# Patient Record
Sex: FEMALE | Race: WHITE | NOT HISPANIC OR LATINO | URBAN - METROPOLITAN AREA
[De-identification: names, ages, dates, MRNs, and addresses within clinical notes are randomized per-mention and may not be internally consistent; named-entity substitution may affect disease eponyms.]

---

## 2023-06-27 ENCOUNTER — OFFICE VISIT (OUTPATIENT)
Dept: PRIMARY CARE | Facility: CLINIC | Age: 58
End: 2023-06-27
Payer: COMMERCIAL

## 2023-06-27 VITALS
WEIGHT: 293 LBS | HEART RATE: 85 BPM | RESPIRATION RATE: 16 BRPM | HEIGHT: 70 IN | DIASTOLIC BLOOD PRESSURE: 80 MMHG | BODY MASS INDEX: 41.95 KG/M2 | SYSTOLIC BLOOD PRESSURE: 140 MMHG

## 2023-06-27 DIAGNOSIS — E66.01 OBESITY, CLASS III, BMI 40-49.9 (MORBID OBESITY) (MULTI): ICD-10-CM

## 2023-06-27 DIAGNOSIS — E78.00 HYPERCHOLESTEROLEMIA: ICD-10-CM

## 2023-06-27 DIAGNOSIS — R76.0 LUPUS ANTICOAGULANT POSITIVE: ICD-10-CM

## 2023-06-27 DIAGNOSIS — E11.9 TYPE 2 DIABETES MELLITUS WITHOUT COMPLICATION, WITHOUT LONG-TERM CURRENT USE OF INSULIN (MULTI): ICD-10-CM

## 2023-06-27 DIAGNOSIS — Z00.00 ROUTINE GENERAL MEDICAL EXAMINATION AT A HEALTH CARE FACILITY: Primary | ICD-10-CM

## 2023-06-27 PROBLEM — I26.09 OTHER PULMONARY EMBOLISM WITH ACUTE COR PULMONALE (MULTI): Status: ACTIVE | Noted: 2023-06-27

## 2023-06-27 PROBLEM — E28.2 PCOS (POLYCYSTIC OVARIAN SYNDROME): Status: ACTIVE | Noted: 2023-06-27

## 2023-06-27 PROBLEM — E66.813 OBESITY, CLASS III, BMI 40-49.9 (MORBID OBESITY): Status: ACTIVE | Noted: 2018-05-29

## 2023-06-27 PROBLEM — E55.9 VITAMIN D DEFICIENCY: Status: ACTIVE | Noted: 2023-06-27

## 2023-06-27 PROBLEM — I82.409 DVT (DEEP VENOUS THROMBOSIS) (MULTI): Status: ACTIVE | Noted: 2023-06-27

## 2023-06-27 PROBLEM — L71.9 ROSACEA: Status: ACTIVE | Noted: 2023-06-27

## 2023-06-27 LAB — POC HEMOGLOBIN A1C: 8.9 % (ref 4.2–6.5)

## 2023-06-27 PROCEDURE — 3079F DIAST BP 80-89 MM HG: CPT | Performed by: FAMILY MEDICINE

## 2023-06-27 PROCEDURE — 99396 PREV VISIT EST AGE 40-64: CPT | Performed by: FAMILY MEDICINE

## 2023-06-27 PROCEDURE — 83036 HEMOGLOBIN GLYCOSYLATED A1C: CPT | Performed by: FAMILY MEDICINE

## 2023-06-27 PROCEDURE — 3077F SYST BP >= 140 MM HG: CPT | Performed by: FAMILY MEDICINE

## 2023-06-27 PROCEDURE — 1036F TOBACCO NON-USER: CPT | Performed by: FAMILY MEDICINE

## 2023-06-27 RX ORDER — CETIRIZINE HYDROCHLORIDE 10 MG/1
10 TABLET ORAL DAILY
COMMUNITY
End: 2024-06-05 | Stop reason: WASHOUT

## 2023-06-27 RX ORDER — METRONIDAZOLE 10 MG/G
GEL TOPICAL 2 TIMES DAILY
COMMUNITY
Start: 2021-12-22

## 2023-06-27 RX ORDER — METFORMIN HYDROCHLORIDE 1000 MG/1
500 TABLET ORAL 2 TIMES DAILY
COMMUNITY
Start: 2020-01-06 | End: 2023-09-07 | Stop reason: SDUPTHER

## 2023-06-27 RX ORDER — DAPAGLIFLOZIN 10 MG/1
10 TABLET, FILM COATED ORAL
COMMUNITY
Start: 2021-09-15 | End: 2023-09-07 | Stop reason: SDUPTHER

## 2023-06-27 RX ORDER — RIVAROXABAN 20 MG/1
20 TABLET, FILM COATED ORAL DAILY
COMMUNITY
Start: 2016-09-09 | End: 2023-09-07 | Stop reason: SDUPTHER

## 2023-06-27 RX ORDER — SPIRONOLACTONE 50 MG/1
50 TABLET, FILM COATED ORAL DAILY
COMMUNITY
Start: 2013-07-26 | End: 2023-09-07 | Stop reason: SDUPTHER

## 2023-06-27 RX ORDER — ALBUTEROL SULFATE 90 UG/1
2 AEROSOL, METERED RESPIRATORY (INHALATION)
COMMUNITY
Start: 2019-07-19 | End: 2023-10-11 | Stop reason: ALTCHOICE

## 2023-06-27 ASSESSMENT — ENCOUNTER SYMPTOMS
FATIGUE: 0
ABDOMINAL PAIN: 0
SEIZURES: 0
DYSPHORIC MOOD: 0
FEVER: 0
BLOOD IN STOOL: 0
RHINORRHEA: 0
BACK PAIN: 0
PALPITATIONS: 0
SHORTNESS OF BREATH: 1
COUGH: 1
DIARRHEA: 0
CONSTIPATION: 0
FREQUENCY: 0
ARTHRALGIAS: 0
DYSURIA: 0
NERVOUS/ANXIOUS: 0
HEADACHES: 0

## 2023-06-27 ASSESSMENT — PATIENT HEALTH QUESTIONNAIRE - PHQ9
1. LITTLE INTEREST OR PLEASURE IN DOING THINGS: NOT AT ALL
2. FEELING DOWN, DEPRESSED OR HOPELESS: NOT AT ALL
SUM OF ALL RESPONSES TO PHQ9 QUESTIONS 1 AND 2: 0

## 2023-06-27 ASSESSMENT — PAIN SCALES - GENERAL: PAINLEVEL: 0-NO PAIN

## 2023-06-27 NOTE — PROGRESS NOTES
"Subjective   Patient ID: Bernard Thompson is a 57 y.o. female who presents for Annual Exam and Diabetes.  Well Adult Physical   Patient here for a comprehensive physical exam.    Do you take any herbs or supplements that were not prescribed by a doctor? no   Are you taking calcium supplements?    Are you taking aspirin daily? no     History:  LMP: No LMP recorded.  Menopause at 39 years  Last pap date: 2022  Abnormal pap? no  : 0  Para: 0    Non-smoker  Alcohol 1-2 drinks a month  No exercise    Checking glucose        Review of Systems   Constitutional:  Negative for fatigue and fever.        Weight gain   HENT:  Negative for congestion, hearing loss and rhinorrhea.    Respiratory:  Positive for cough and shortness of breath.    Cardiovascular:  Negative for chest pain and palpitations.   Gastrointestinal:  Negative for abdominal pain, blood in stool, constipation and diarrhea.   Genitourinary:  Positive for urgency. Negative for dysuria and frequency.   Musculoskeletal:  Negative for arthralgias and back pain.   Skin:  Negative for rash.   Neurological:  Negative for seizures and headaches.   Psychiatric/Behavioral:  Negative for dysphoric mood. The patient is not nervous/anxious.        Objective   /80 (BP Location: Left arm, Patient Position: Sitting, BP Cuff Size: Large adult long)   Pulse 85   Resp 16   Ht 1.778 m (5' 10\")   Wt (!) 155 kg (341 lb)   BMI 48.93 kg/m²     Physical Exam  Vitals reviewed.   Constitutional:       Appearance: Normal appearance.   Cardiovascular:      Rate and Rhythm: Normal rate and regular rhythm.      Pulses:           Dorsalis pedis pulses are 2+ on the right side and 2+ on the left side.      Heart sounds: No murmur heard.  Pulmonary:      Effort: Pulmonary effort is normal.      Breath sounds: Normal breath sounds.   Musculoskeletal:      Right lower leg: No edema.      Left lower leg: No edema.   Feet:      Right foot:      Skin integrity: Skin integrity " normal.      Left foot:      Skin integrity: Skin integrity normal.   Skin:     Capillary Refill: Capillary refill takes less than 2 seconds.   Neurological:      Mental Status: She is alert.   Psychiatric:         Mood and Affect: Mood normal.         Behavior: Behavior normal.           Assessment/Plan   Problem List Items Addressed This Visit       Hypercholesterolemia    Lupus anticoagulant positive    Relevant Medications    Xarelto 20 mg tablet    Obesity, Class III, BMI 40-49.9 (morbid obesity) (CMS/[facility])    Type 2 diabetes mellitus without complication, without long-term current use of insulin (CMS/[facility])    Relevant Orders    POCT glycosylated hemoglobin (Hb A1C) manually resulted (Completed)    Basic Metabolic Panel    Lipid Panel    Albumin , Urine Random     Other Visit Diagnoses       Routine general medical examination at a health care facility    -  Primary

## 2023-06-27 NOTE — PATIENT INSTRUCTIONS
Immunizations recommended:  --Flu shot every fall.  --Shingrix is the shingles vaccine, and can be done after the age of 50.  --Tetanus every 10 years.  Yours was done in 2016.  --Covid vaccine.    Pap done with oncology, due to history of endometrial cancer.  Last was  normal in Nov. 2022.    Colonoscopy should be done every 10 years after the age of 45, unless there was a previous abnormality, or family history of colon cancer.  Yours was being followed every 5 years, due to history of endometrial cancer.   Last one in 2017 was normal.    Mammogram screening recommendations are changing, but at this time, I recommend a mammogram every 1-2 years in your 40's, and yearly after the age of 50.  Having a family history of breast cancer may change that.  Done June 2023.    You should try to get 150 minutes of exercise weekly.  Be sure to eat a diet rich in fruits and vegetables, and low in saturated fats and sodium.  Strive for a healthy BMI of less than 25.     Make sure to wear sun screen when outside, and check for changing or unusual moles.    Pre-menopause recommendations are for 1000 mg calcium and 1000 units vitamin D3 daily.  After menopause calcium recommendation is 1200 mg, with 1000 units of vitamin D3    I recommend you get a Living Will and Durable Power of  for Healthcare. These documents state what care you would want if you couldn't speak for yourself. They help your loved ones in caring for you if that happens.   Once you have those forms completed, you can keep a copy on file with your doctor.    Specialists being seen:  Gyn for pap    A1C 8.9 is higher than desired.  Continue metformin and farxiga.  Consider rybelsus daily to improve control.  Check with insurance regarding coverage.                                            Treatment goals include:  HgbA1C less than 7.0  /80 on consistent basis, with no higher than 140/85  LDL cholesterol less than 100, and HDL cholesterol above  40.  Yearly retinal exam  Check feet periodically for sores or decreased sensation  Exercise at least 150 minutes weekly.  Work toward a goal BMI of less than 25.    Check fasting blood work.  Fast for 12 hours prior to having blood drawn.  You should drink plenty of water, and can have black tea or black coffee, if you'd like.    For clotting disorder, taking xarelto.

## 2023-07-22 ENCOUNTER — LAB (OUTPATIENT)
Dept: LAB | Facility: LAB | Age: 58
End: 2023-07-22
Payer: COMMERCIAL

## 2023-07-22 DIAGNOSIS — E11.9 TYPE 2 DIABETES MELLITUS WITHOUT COMPLICATION, WITHOUT LONG-TERM CURRENT USE OF INSULIN (MULTI): ICD-10-CM

## 2023-07-22 LAB
ALBUMIN (MG/L) IN URINE: <7 MG/L
ALBUMIN/CREATININE (UG/MG) IN URINE: NORMAL UG/MG CRT (ref 0–30)
ANION GAP IN SER/PLAS: 15 MMOL/L (ref 10–20)
CALCIUM (MG/DL) IN SER/PLAS: 9.8 MG/DL (ref 8.6–10.6)
CARBON DIOXIDE, TOTAL (MMOL/L) IN SER/PLAS: 27 MMOL/L (ref 21–32)
CHLORIDE (MMOL/L) IN SER/PLAS: 102 MMOL/L (ref 98–107)
CHOLESTEROL (MG/DL) IN SER/PLAS: 210 MG/DL (ref 0–199)
CHOLESTEROL IN HDL (MG/DL) IN SER/PLAS: 46.2 MG/DL
CHOLESTEROL/HDL RATIO: 4.5
CREATININE (MG/DL) IN SER/PLAS: 0.96 MG/DL (ref 0.5–1.05)
CREATININE (MG/DL) IN URINE: 87.9 MG/DL (ref 20–320)
GFR FEMALE: 68 ML/MIN/1.73M2
GLUCOSE (MG/DL) IN SER/PLAS: 184 MG/DL (ref 74–99)
LDL: 130 MG/DL (ref 0–99)
POTASSIUM (MMOL/L) IN SER/PLAS: 4.5 MMOL/L (ref 3.5–5.3)
SODIUM (MMOL/L) IN SER/PLAS: 139 MMOL/L (ref 136–145)
TRIGLYCERIDE (MG/DL) IN SER/PLAS: 170 MG/DL (ref 0–149)
UREA NITROGEN (MG/DL) IN SER/PLAS: 14 MG/DL (ref 6–23)
VLDL: 34 MG/DL (ref 0–40)

## 2023-07-22 PROCEDURE — 82570 ASSAY OF URINE CREATININE: CPT

## 2023-07-22 PROCEDURE — 80048 BASIC METABOLIC PNL TOTAL CA: CPT

## 2023-07-22 PROCEDURE — 82043 UR ALBUMIN QUANTITATIVE: CPT

## 2023-07-22 PROCEDURE — 80061 LIPID PANEL: CPT

## 2023-07-22 PROCEDURE — 36415 COLL VENOUS BLD VENIPUNCTURE: CPT

## 2023-09-07 ENCOUNTER — OFFICE VISIT (OUTPATIENT)
Dept: PRIMARY CARE | Facility: CLINIC | Age: 58
End: 2023-09-07
Payer: COMMERCIAL

## 2023-09-07 VITALS
HEART RATE: 110 BPM | DIASTOLIC BLOOD PRESSURE: 80 MMHG | BODY MASS INDEX: 48.93 KG/M2 | TEMPERATURE: 97.1 F | OXYGEN SATURATION: 94 % | SYSTOLIC BLOOD PRESSURE: 140 MMHG | HEIGHT: 70 IN

## 2023-09-07 DIAGNOSIS — E11.9 TYPE 2 DIABETES MELLITUS WITHOUT COMPLICATION, WITHOUT LONG-TERM CURRENT USE OF INSULIN (MULTI): ICD-10-CM

## 2023-09-07 DIAGNOSIS — I82.409 DEEP VEIN THROMBOSIS (DVT) OF LOWER EXTREMITY, UNSPECIFIED CHRONICITY, UNSPECIFIED LATERALITY, UNSPECIFIED VEIN (MULTI): ICD-10-CM

## 2023-09-07 DIAGNOSIS — N76.0 ACUTE VAGINITIS: ICD-10-CM

## 2023-09-07 DIAGNOSIS — B37.31 CANDIDAL VULVITIS: Primary | ICD-10-CM

## 2023-09-07 DIAGNOSIS — I27.82 OTHER CHRONIC PULMONARY EMBOLISM WITH ACUTE COR PULMONALE (MULTI): ICD-10-CM

## 2023-09-07 DIAGNOSIS — I26.09 OTHER CHRONIC PULMONARY EMBOLISM WITH ACUTE COR PULMONALE (MULTI): ICD-10-CM

## 2023-09-07 DIAGNOSIS — B37.31 YEAST VAGINITIS: ICD-10-CM

## 2023-09-07 LAB
POC CLUE CELL WET PREP: ABNORMAL
POC TRICHOMONAS WET PREP: ABNORMAL
POC WBC WET PREP: ABNORMAL
POC YEAST CELLS WET PREP: PRESENT

## 2023-09-07 PROCEDURE — 87210 SMEAR WET MOUNT SALINE/INK: CPT | Performed by: FAMILY MEDICINE

## 2023-09-07 PROCEDURE — 99213 OFFICE O/P EST LOW 20 MIN: CPT | Performed by: FAMILY MEDICINE

## 2023-09-07 PROCEDURE — 1036F TOBACCO NON-USER: CPT | Performed by: FAMILY MEDICINE

## 2023-09-07 PROCEDURE — 3077F SYST BP >= 140 MM HG: CPT | Performed by: FAMILY MEDICINE

## 2023-09-07 PROCEDURE — 3079F DIAST BP 80-89 MM HG: CPT | Performed by: FAMILY MEDICINE

## 2023-09-07 RX ORDER — RIVAROXABAN 20 MG/1
20 TABLET, FILM COATED ORAL DAILY
Qty: 90 TABLET | Refills: 1 | Status: SHIPPED | OUTPATIENT
Start: 2023-09-07 | End: 2024-02-14 | Stop reason: SDUPTHER

## 2023-09-07 RX ORDER — FLUCONAZOLE 150 MG/1
TABLET ORAL
Qty: 3 TABLET | Refills: 0 | Status: SHIPPED | OUTPATIENT
Start: 2023-09-07 | End: 2023-10-11 | Stop reason: ALTCHOICE

## 2023-09-07 RX ORDER — DAPAGLIFLOZIN 10 MG/1
10 TABLET, FILM COATED ORAL
Qty: 90 TABLET | Refills: 1 | Status: SHIPPED | OUTPATIENT
Start: 2023-09-07 | End: 2024-02-14 | Stop reason: SDUPTHER

## 2023-09-07 RX ORDER — SPIRONOLACTONE 50 MG/1
50 TABLET, FILM COATED ORAL DAILY
Qty: 90 TABLET | Refills: 1 | Status: SHIPPED | OUTPATIENT
Start: 2023-09-07 | End: 2024-02-14 | Stop reason: SDUPTHER

## 2023-09-07 RX ORDER — METFORMIN HYDROCHLORIDE 1000 MG/1
500 TABLET ORAL 2 TIMES DAILY
Qty: 90 TABLET | Refills: 1 | Status: SHIPPED | OUTPATIENT
Start: 2023-09-07 | End: 2024-02-14 | Stop reason: SDUPTHER

## 2023-09-07 ASSESSMENT — ENCOUNTER SYMPTOMS
SORE THROAT: 1
DYSURIA: 1
HEADACHES: 1
ANOREXIA: 1
DIARRHEA: 1
BACK PAIN: 0
ABDOMINAL PAIN: 1
FEVER: 0
CHILLS: 0
NAUSEA: 1
FLANK PAIN: 0
VOMITING: 0
CONSTIPATION: 0
HEMATURIA: 0
FREQUENCY: 0

## 2023-09-07 ASSESSMENT — PATIENT HEALTH QUESTIONNAIRE - PHQ9
SUM OF ALL RESPONSES TO PHQ9 QUESTIONS 1 AND 2: 0
2. FEELING DOWN, DEPRESSED OR HOPELESS: NOT AT ALL
1. LITTLE INTEREST OR PLEASURE IN DOING THINGS: NOT AT ALL

## 2023-09-07 ASSESSMENT — PAIN SCALES - GENERAL: PAINLEVEL: 3

## 2023-09-07 NOTE — PROGRESS NOTES
"Subjective   Patient ID: Bernard Thompson is a 57 y.o. female who presents for Vaginal Pain (Vaginal irritation with pain ).  Patient was put on amox 3 days ago for severe ST.  Throat felt better after a day.  Then vulva became puffy, and painful.  Now similar symptoms between butt  cheeks,and is weeping.  No itching.  No vaginal discharge.    Female  Problem  The patient's primary symptoms include a genital rash. This is a new problem. The current episode started yesterday. The problem occurs constantly. The problem has been rapidly worsening. The pain is mild. The problem affects both sides. She is not pregnant. Associated symptoms include abdominal pain, anorexia, diarrhea, dysuria, headaches, nausea, rash and a sore throat. Pertinent negatives include no back pain, chills, constipation, discolored urine, fever, flank pain, frequency, hematuria, joint pain, joint swelling, painful intercourse, urgency or vomiting. She is not sexually active. It is unknown whether or not her partner has an STD. She uses hysterectomy for contraception. She is postmenopausal.       Review of Systems   Constitutional:  Negative for chills and fever.   HENT:  Positive for sore throat.    Gastrointestinal:  Positive for abdominal pain, anorexia, diarrhea and nausea. Negative for constipation and vomiting.   Genitourinary:  Positive for dysuria and vaginal pain. Negative for flank pain, frequency, hematuria and urgency.   Musculoskeletal:  Negative for back pain and joint pain.   Skin:  Positive for rash.   Neurological:  Positive for headaches.       Objective   /80 (BP Location: Right arm, Patient Position: Sitting, BP Cuff Size: Large adult)   Pulse 110   Temp 36.2 °C (97.1 °F) (Temporal)   Ht 1.778 m (5' 10\")   SpO2 94%   BMI 48.93 kg/m²     Physical Exam  Vitals reviewed.   Constitutional:       Appearance: Normal appearance.   HENT:      Mouth/Throat:      Pharynx: Posterior oropharyngeal erythema present. No " oropharyngeal exudate.   Cardiovascular:      Rate and Rhythm: Normal rate and regular rhythm.   Pulmonary:      Effort: Pulmonary effort is normal.      Breath sounds: Normal breath sounds.   Genitourinary:     Labia:         Right: Rash and tenderness present.         Left: Rash and tenderness present.       Vagina: Vaginal discharge (small amount thick white discharge, mainly near introitus) present.          Comments: Erythematous patch with satellite lesions, and clear drainage from buttock area.  Encompasses vulva, perineal area, and into  medial gluteal cheeks to anus.  Lymphadenopathy:      Cervical: Cervical adenopathy (more on right) present.   Neurological:      Mental Status: She is alert.           Assessment/Plan   Problem List Items Addressed This Visit       DVT (deep venous thrombosis) (CMS/HCC)    Other pulmonary embolism with acute cor pulmonale (CMS/HCC)    Relevant Medications    Xarelto 20 mg tablet    Type 2 diabetes mellitus without complication, without long-term current use of insulin (CMS/HCC)    Relevant Medications    Farxiga 10 mg    spironolactone (Aldactone) 50 mg tablet    metFORMIN (Glucophage) 1,000 mg tablet     Other Visit Diagnoses       Candidal vulvitis    -  Primary    Acute vaginitis        Relevant Medications    fluconazole (Diflucan) 150 mg tablet    Yeast vaginitis        Relevant Orders    POCT [place] manually resulted (Completed)

## 2023-09-08 NOTE — PATIENT INSTRUCTIONS
Acute yeast vulvitis/vaginitis most likely triggered by taking amoxicillin.  Fluconazole 150 mg prescribed to take 1 today, then every other day for 3 doses.  Can use topical A&D to sooth the area, or if not improving, can add monistat cream.  Follow up if not improving.    Recent severe sore throat is improving on amoxicillin.    Routine medications refilled.

## 2023-10-03 ENCOUNTER — ANCILLARY PROCEDURE (OUTPATIENT)
Dept: RADIOLOGY | Facility: CLINIC | Age: 58
End: 2023-10-03

## 2023-10-03 DIAGNOSIS — E11.9 TYPE 2 DIABETES MELLITUS WITHOUT COMPLICATIONS (MULTI): ICD-10-CM

## 2023-10-03 PROCEDURE — 75571 CT HRT W/O DYE W/CA TEST: CPT

## 2023-10-10 ASSESSMENT — ENCOUNTER SYMPTOMS
HEADACHES: 0
CONFUSION: 0
TREMORS: 0
SEIZURES: 0
FATIGUE: 1
VISUAL CHANGE: 0
POLYDIPSIA: 1
NERVOUS/ANXIOUS: 0
BLURRED VISION: 0
HUNGER: 0
DIZZINESS: 0
SWEATS: 1
SPEECH DIFFICULTY: 0
WEIGHT LOSS: 0
POLYPHAGIA: 0
WEAKNESS: 0
BLACKOUTS: 0

## 2023-10-11 ENCOUNTER — OFFICE VISIT (OUTPATIENT)
Dept: PRIMARY CARE | Facility: CLINIC | Age: 58
End: 2023-10-11
Payer: COMMERCIAL

## 2023-10-11 VITALS
OXYGEN SATURATION: 95 % | BODY MASS INDEX: 41.95 KG/M2 | HEART RATE: 88 BPM | DIASTOLIC BLOOD PRESSURE: 76 MMHG | WEIGHT: 293 LBS | HEIGHT: 70 IN | SYSTOLIC BLOOD PRESSURE: 132 MMHG | TEMPERATURE: 97.1 F

## 2023-10-11 DIAGNOSIS — E11.9 TYPE 2 DIABETES MELLITUS WITHOUT COMPLICATION, WITHOUT LONG-TERM CURRENT USE OF INSULIN (MULTI): ICD-10-CM

## 2023-10-11 DIAGNOSIS — Z23 ENCOUNTER FOR IMMUNIZATION: ICD-10-CM

## 2023-10-11 DIAGNOSIS — E11.9 TYPE 2 DIABETES MELLITUS WITHOUT COMPLICATION, WITHOUT LONG-TERM CURRENT USE OF INSULIN (MULTI): Primary | ICD-10-CM

## 2023-10-11 DIAGNOSIS — I27.82 OTHER CHRONIC PULMONARY EMBOLISM WITH ACUTE COR PULMONALE (MULTI): ICD-10-CM

## 2023-10-11 DIAGNOSIS — I26.09 OTHER CHRONIC PULMONARY EMBOLISM WITH ACUTE COR PULMONALE (MULTI): ICD-10-CM

## 2023-10-11 DIAGNOSIS — N76.0 ACUTE VAGINITIS: ICD-10-CM

## 2023-10-11 PROCEDURE — 3075F SYST BP GE 130 - 139MM HG: CPT | Performed by: FAMILY MEDICINE

## 2023-10-11 PROCEDURE — 99213 OFFICE O/P EST LOW 20 MIN: CPT | Performed by: FAMILY MEDICINE

## 2023-10-11 PROCEDURE — 90471 IMMUNIZATION ADMIN: CPT | Performed by: FAMILY MEDICINE

## 2023-10-11 PROCEDURE — 1036F TOBACCO NON-USER: CPT | Performed by: FAMILY MEDICINE

## 2023-10-11 PROCEDURE — 90686 IIV4 VACC NO PRSV 0.5 ML IM: CPT | Performed by: FAMILY MEDICINE

## 2023-10-11 PROCEDURE — 3078F DIAST BP <80 MM HG: CPT | Performed by: FAMILY MEDICINE

## 2023-10-11 RX ORDER — ORAL SEMAGLUTIDE 7 MG/1
7 TABLET ORAL DAILY
Qty: 90 TABLET | Refills: 0 | Status: SHIPPED | OUTPATIENT
Start: 2023-10-11 | End: 2024-02-05 | Stop reason: SDUPTHER

## 2023-10-11 RX ORDER — FLUCONAZOLE 150 MG/1
150 TABLET ORAL DAILY PRN
Qty: 10 TABLET | Refills: 0 | Status: SHIPPED | OUTPATIENT
Start: 2023-10-11

## 2023-10-11 RX ORDER — ORAL SEMAGLUTIDE 7 MG/1
7 TABLET ORAL DAILY
Qty: 90 TABLET | Refills: 0 | OUTPATIENT
Start: 2023-10-11 | End: 2024-01-09

## 2023-10-11 ASSESSMENT — ENCOUNTER SYMPTOMS
SEIZURES: 0
POLYDIPSIA: 1
CONFUSION: 0
BLACKOUTS: 0
NERVOUS/ANXIOUS: 0
WEAKNESS: 0
DIZZINESS: 0
FATIGUE: 1
BLURRED VISION: 0
TREMORS: 0
POLYPHAGIA: 0
SWEATS: 1
HEADACHES: 0
SPEECH DIFFICULTY: 0
VISUAL CHANGE: 0
WEIGHT LOSS: 0
HUNGER: 0

## 2023-10-11 NOTE — PROGRESS NOTES
Subjective   Patient ID: Bernard Thompson is a 57 y.o. female who presents for Diabetes and Flu Vaccine.  Diabetes  She has type 2 diabetes mellitus. No MedicAlert identification noted. The initial diagnosis of diabetes was made 2 years ago. Hypoglycemia symptoms include sleepiness and sweats. Pertinent negatives for hypoglycemia include no confusion, dizziness, headaches, hunger, mood changes, nervousness/anxiousness, pallor, seizures, speech difficulty or tremors. Associated symptoms include fatigue, foot paresthesias and polydipsia. Pertinent negatives for diabetes include no blurred vision, no chest pain, no foot ulcerations, no polyphagia, no polyuria, no visual change, no weakness and no weight loss. Pertinent negatives for hypoglycemia complications include no blackouts, no hospitalization, no nocturnal hypoglycemia, no required assistance and no required glucagon injection. Symptoms are stable. Pertinent negatives for diabetic complications include no CVA, heart disease, impotence, nephropathy, peripheral neuropathy, PVD or retinopathy. Risk factors for coronary artery disease include obesity, post-menopausal, sedentary lifestyle and stress. Current diabetic treatment includes oral agent (dual therapy). She is compliant with treatment most of the time. Her weight is stable. She has not had a previous visit with a dietitian. She never participates in exercise. She monitors blood glucose at home 3-4 x per week. Blood glucose monitoring compliance is inadequate. Her home blood glucose trend is fluctuating minimally. She does not see a podiatrist.Eye exam is not current.       Review of Systems   Constitutional:  Positive for fatigue. Negative for weight loss.   Eyes:  Negative for blurred vision.   Cardiovascular:  Negative for chest pain.   Endocrine: Positive for polydipsia. Negative for polyphagia and polyuria.   Genitourinary:  Negative for impotence.   Skin:  Negative for pallor.   Neurological:  Negative  "for dizziness, tremors, seizures, speech difficulty, weakness and headaches.   Psychiatric/Behavioral:  Negative for confusion. The patient is not nervous/anxious.        Objective   /76 (BP Location: Right arm, Patient Position: Sitting, BP Cuff Size: Large adult)   Pulse 88   Temp 36.2 °C (97.1 °F) (Temporal)   Ht 1.778 m (5' 10\")   Wt (!) 153 kg (337 lb 3.2 oz)   SpO2 95%   BMI 48.38 kg/m²     Physical Exam  Vitals reviewed.   Constitutional:       Appearance: Normal appearance.   Cardiovascular:      Rate and Rhythm: Normal rate and regular rhythm.      Heart sounds: No murmur heard.  Pulmonary:      Effort: Pulmonary effort is normal.      Breath sounds: Normal breath sounds.   Neurological:      Mental Status: She is alert.           Assessment/Plan   Problem List Items Addressed This Visit       Other pulmonary embolism with acute cor pulmonale (CMS/HCC)    Type 2 diabetes mellitus without complication, without long-term current use of insulin (CMS/HCC) - Primary    Relevant Medications    semaglutide (Rybelsus) 7 mg tablet     Other Visit Diagnoses       Acute vaginitis        Relevant Medications    fluconazole (Diflucan) 150 mg tablet               "

## 2023-10-11 NOTE — PROGRESS NOTES
Answers submitted by the patient for this visit:  Diabetes Questionnaire (Submitted on 10/10/2023)  Chief Complaint: Diabetes problem  Diabetes type: type 2  MedicAlert ID: No  Disease duration: 2 Years  blurred vision: No  chest pain: No  fatigue: Yes  foot paresthesias: Yes  foot ulcerations: No  polydipsia: Yes  polyphagia: No  polyuria: No  visual change: No  weakness: No  weight loss: No  Symptom course: stable  confusion: No  dizziness: No  headaches: No  hunger: No  mood changes: No  nervous/anxious: No  pallor: No  seizures: No  sleepiness: Yes  speech difficulty: No  sweats: Yes  tremors: No  blackouts: No  hospitalization: No  nocturnal hypoglycemia: No  required assistance: No  required glucagon: No  CVA: No  heart disease: No  impotence: No  nephropathy: No  peripheral neuropathy: No  PVD: No  retinopathy: No  CAD risks: obesity, post-menopausal, sedentary lifestyle, stress  Current treatments: oral agent (dual therapy)  Treatment compliance: most of the time  Home blood tests: 3-4 x per week  Monitoring compliance: inadequate  Blood glucose trend: fluctuating minimally  Weight trend: stable  Exercise: never  Dietitian visit: No  Eye exam current: No  Sees podiatrist: No

## 2023-10-11 NOTE — PATIENT INSTRUCTIONS
A1C 9.4, taking metformin and farxiga.  Add Rybelsus 7 mg daily.  Continue to check glucose daily.  After a few weeks, let  me know how your numbers are running.   If too high, will increase dose to 14 mg daily.  Plan to follow up in office in 4 months to recheck A1C.    Fluconazole sent to pharmacy to use if yeast infection recurs.  Typically use 1 pill per episode, although sometimes more, if it is severe.  Getting glucose down should help reduce frequency of yeast infections.

## 2023-10-17 DIAGNOSIS — E11.9 TYPE 2 DIABETES MELLITUS WITHOUT COMPLICATION, WITHOUT LONG-TERM CURRENT USE OF INSULIN (MULTI): Primary | ICD-10-CM

## 2023-12-06 NOTE — PROGRESS NOTES
Patient ID: Bernard Thompson is a 58 y.o. female.  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     August 4, 2005:TOTAL ABDOMINAL HYSTERECTOMY AND BILATERAL SALPINGO-OOPHORECTOMY AND PELVIC LYMPHADENECTOMY.      September 2005:  Vaginal brachytherapy.  Treatment was given in three weekly doses of 700 cGy to 0.5 cm depth using the vaginal applicator for a total dose of 2100 cGy to the vaginal mucosa in two weeks.      Last colonoscopy was negative 2006.  10 y f/u recommended.           Objective   There were no vitals taken for this visit.      Interval history:  Patient is a 59 yo with Stage 1B, FIGO Grade 1 s/p NATY, BSO, PLND 8/2005 and s/p vaginal brachytherapy done 9/2005.  Here for annual exam.      Pt presents for scheduled appointment w/ complaints of recurrent vaginal pruritus and irritation beginning in September at which time she was treated for a yeast infection. She initially completed a 3-day course of fluconazole which resolved her symptoms for about two weeks. When the pruritus began again she used monistat to relieve her symptoms, but again the symptoms came back about two weeks later. The third time the pruritus occurred, she was prescribed a second, longer course of fluconazole but did not complete it. She states she has been having no discharge.  Patient states she has been using toilet paper to scratch the areas.  She denies vaginal bleeding, pink-tinged discharge, or hematuria. She recently began semaglutide which has caused some increased food-sensitivity resulting in occasional diarrhea or constipation and decreased appetite. Her blood sugars are getting under better control.  She experiences occasional stress incontinence. Energy levels are adequate. She is not currently sexually active. States her last mammogram was in June 2023.        Physical Exam:     Constitutional: Doing well.  CAROLYN  Eyes: PERRL  ENMT: Moist mucus membranes  Head/Neck: Supple. Symmetrical  Cardiovascular: Regular, rate and rhythm. 2+ equal pulses of the extremities  Respiratory/Thorax: CTA. RRR. Chest rise symmetrical.  Gastrointestinal: Non-distended, soft, non-tender  Genitourinary:   Normal external female genitalia. No vulvar lesions noted.  Atrophic tissue noted with excoriated areas.    Speculum exam: Smooth vaginal walls without lesions or masses. Vaginal cuff visualized without lesions.   Bimanual exam: Smooth vaginal wall without lesions or masses.  Surgically absent uterus, cervix, and adnexa.    Rectovaginal exam: smooth rectovaginal septum without lesions or masses  Musculoskeletal: ROM intact, no joint swelling, normal strength  Extremities: No edema  Neurological: Alert and oriented x 3. Pleasant and cooperative.  Lymphatic: No lymphadenopathy. No lymphedema  Psychological: Appropriate mood and behavior  Skin: Warm and dry, no lesions, no rashes     A complete review of systems was performed and all systems were normal except what is noted in the interval history.        Assessment/Plan   Patient is a 57 yo with Stage 1B, FIGO Grade 1 s/p NATY, BSO, PLND 8/2005 and s/p vaginal brachytherapy done 9/2005.  CAROLYN 7 yr.  Vaginal atrophy.          PLAN:    F/U in 1 year or as needed  RX for Estrace sent, intravaginally three nights a week, using dime to nickel size amount on finger and applying at specified area.  Do not use applicator.  Mammogram order given  Physical examination was within normal limits today.  She is currently CAROLYN.  We reviewed signs and symptoms of possible recurrence with the patient and she will call our office should she experience any of these.               FRANCISCO Phelan     I was present with the APRN student who participated in the documentation of this note.  I have personally seen and re-examined the patient and performed the medical decision-making components (assessment and plan of  care). I have reviewed the APRN student documentation and verified the findings in the note as written with additions or exceptions as stated in the body of this note.

## 2023-12-07 ENCOUNTER — OFFICE VISIT (OUTPATIENT)
Dept: GYNECOLOGIC ONCOLOGY | Facility: CLINIC | Age: 58
End: 2023-12-07
Payer: COMMERCIAL

## 2023-12-07 ENCOUNTER — DOCUMENTATION (OUTPATIENT)
Dept: GYNECOLOGIC ONCOLOGY | Facility: HOSPITAL | Age: 58
End: 2023-12-07

## 2023-12-07 VITALS
HEIGHT: 70 IN | WEIGHT: 293 LBS | RESPIRATION RATE: 16 BRPM | HEART RATE: 97 BPM | SYSTOLIC BLOOD PRESSURE: 148 MMHG | DIASTOLIC BLOOD PRESSURE: 80 MMHG | BODY MASS INDEX: 41.95 KG/M2

## 2023-12-07 DIAGNOSIS — Z12.31 SCREENING MAMMOGRAM, ENCOUNTER FOR: ICD-10-CM

## 2023-12-07 DIAGNOSIS — N95.2 VAGINAL ATROPHY: Primary | ICD-10-CM

## 2023-12-07 DIAGNOSIS — C54.1 ENDOMETRIAL CANCER (MULTI): ICD-10-CM

## 2023-12-07 PROCEDURE — 99214 OFFICE O/P EST MOD 30 MIN: CPT | Performed by: NURSE PRACTITIONER

## 2023-12-07 PROCEDURE — 1036F TOBACCO NON-USER: CPT | Performed by: NURSE PRACTITIONER

## 2023-12-07 PROCEDURE — 3077F SYST BP >= 140 MM HG: CPT | Performed by: NURSE PRACTITIONER

## 2023-12-07 PROCEDURE — 3079F DIAST BP 80-89 MM HG: CPT | Performed by: NURSE PRACTITIONER

## 2023-12-07 RX ORDER — ESTRADIOL 0.1 MG/G
CREAM VAGINAL
Qty: 42.5 G | Refills: 3 | Status: SHIPPED | OUTPATIENT
Start: 2023-12-07

## 2023-12-07 ASSESSMENT — PAIN SCALES - GENERAL: PAINLEVEL: 2

## 2023-12-07 NOTE — PROGRESS NOTES
Patient ID: Bernard Thompson is a 58 y.o. female.  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     August 4, 2005:TOTAL ABDOMINAL HYSTERECTOMY AND BILATERAL SALPINGO-OOPHORECTOMY AND PELVIC LYMPHADENECTOMY.      September 2005:  Vaginal brachytherapy.  Treatment was given in three weekly doses of 700 cGy to 0.5 cm depth using the vaginal applicator for a total dose of 2100 cGy to the vaginal mucosa in two weeks.      Last colonoscopy was negative 2006.  10 y f/u recommended.          Objective   There were no vitals taken for this visit.      Interval history:  Patient is a 59 yo with Stage 1B, FIGO Grade 1 s/p NATY, BSO, PLND 8/2005 and s/p vaginal brachytherapy done 9/2005.  Here for annual exam.     Pt presents for scheduled appointment w/ complaints of recurrent vaginal itchiness and irritation beginning in September at which time she was treated for a yeast infection. She initially completed a 3-day course of fluconazole which resolved her symptoms for about two weeks. When the itchiness began again she used monistat to relieve her symptoms, but again the symptoms came back about two weeks later. The third time the itchiness occurred, she was prescribed a second, longer course of fluconazole but did not complete it - as she was questioning whether the symptoms were d/t yeast infection or something else and decided to wait for this appointment to get a second opinion. She denies vaginal bleeding, pink-tinged discharge, or hematuria. She recently began semaglutide which has caused some increased food-sensitivity resulting in occasional diarrhea or constipation and decreased appetite. She experiences occasional stress incontinence. Energy levels are adequate. She is not currently sexually active. States her last mammogram was in June 2023.       Physical Exam:     Constitutional: Doing well. CAROLYN  Eyes:  PERRL  ENMT: Moist mucus membranes  Head/Neck: Supple. Symmetrical  Cardiovascular: Regular, rate and rhythm. 2+ equal pulses of the extremities  Respiratory/Thorax: CTA. RRR. Chest rise symmetrical.  Gastrointestinal: Non-distended, soft, non-tender  Genitourinary:   Normal external female genitalia. No vulvar lesions noted  Speculum exam: Smooth vaginal walls without lesions or masses. Vaginal cuff visualized without lesions.   Bimanual exam: Smooth vaginal wall without lesions or masses.  Surgically absent uterus, cervix, and adnexa.    Rectovaginal exam: smooth rectovaginal septum without lesions or masses  Musculoskeletal: ROM intact, no joint swelling, normal strength  Extremities: No edema  Neurological: Alert and oriented x 3. Pleasant and cooperative.  Lymphatic: No lymphadenopathy. No lymphedema  Psychological: Appropriate mood and behavior  Skin: Warm and dry, no lesions, no rashes     A complete review of systems was performed and all systems were normal except what is noted in the interval history.        Assessment/Plan   Patient is a 57 yo with Stage 1B, FIGO Grade 1 s/p NATY, BSO, PLND 8/2005 and s/p vaginal brachytherapy done 9/2005.  CAROLYN 7 yr.        PLAN:    Schedule an annual follow-up.  RX for Estrace sent, intravaginally three nights a week, using dime to nickel size amount on finger and applying at specified area.  Do not use applicator.  Call the office to schedule a sooner appointment if new or worsening symptoms occur.          FRANCISCO Phelan    I was present with the APRN student who participated in the documentation of this note.  I have personally seen and re-examined the patient and performed the medical decision-making components (assessment and plan of care). I have reviewed the APRN student documentation and verified the findings in the note as written with additions or exceptions as stated in the body of this note.

## 2024-02-05 DIAGNOSIS — E11.9 TYPE 2 DIABETES MELLITUS WITHOUT COMPLICATION, WITHOUT LONG-TERM CURRENT USE OF INSULIN (MULTI): ICD-10-CM

## 2024-02-14 ENCOUNTER — OFFICE VISIT (OUTPATIENT)
Dept: PRIMARY CARE | Facility: CLINIC | Age: 59
End: 2024-02-14
Payer: COMMERCIAL

## 2024-02-14 VITALS
SYSTOLIC BLOOD PRESSURE: 105 MMHG | DIASTOLIC BLOOD PRESSURE: 79 MMHG | HEIGHT: 70 IN | TEMPERATURE: 97.8 F | OXYGEN SATURATION: 96 % | BODY MASS INDEX: 41.95 KG/M2 | WEIGHT: 293 LBS | HEART RATE: 106 BPM

## 2024-02-14 DIAGNOSIS — I26.09 OTHER CHRONIC PULMONARY EMBOLISM WITH ACUTE COR PULMONALE (MULTI): ICD-10-CM

## 2024-02-14 DIAGNOSIS — I27.82 OTHER CHRONIC PULMONARY EMBOLISM WITH ACUTE COR PULMONALE (MULTI): ICD-10-CM

## 2024-02-14 DIAGNOSIS — E11.9 TYPE 2 DIABETES MELLITUS WITHOUT COMPLICATION, WITHOUT LONG-TERM CURRENT USE OF INSULIN (MULTI): Primary | ICD-10-CM

## 2024-02-14 LAB — POC HEMOGLOBIN A1C: 7.5 % (ref 4.2–6.5)

## 2024-02-14 PROCEDURE — 1036F TOBACCO NON-USER: CPT | Performed by: FAMILY MEDICINE

## 2024-02-14 PROCEDURE — 99213 OFFICE O/P EST LOW 20 MIN: CPT | Performed by: FAMILY MEDICINE

## 2024-02-14 PROCEDURE — 3078F DIAST BP <80 MM HG: CPT | Performed by: FAMILY MEDICINE

## 2024-02-14 PROCEDURE — 3074F SYST BP LT 130 MM HG: CPT | Performed by: FAMILY MEDICINE

## 2024-02-14 PROCEDURE — 83036 HEMOGLOBIN GLYCOSYLATED A1C: CPT | Performed by: FAMILY MEDICINE

## 2024-02-14 RX ORDER — RIVAROXABAN 20 MG/1
20 TABLET, FILM COATED ORAL DAILY
Qty: 90 TABLET | Refills: 1 | Status: SHIPPED | OUTPATIENT
Start: 2024-02-14 | End: 2024-06-05 | Stop reason: SDUPTHER

## 2024-02-14 RX ORDER — DAPAGLIFLOZIN 10 MG/1
10 TABLET, FILM COATED ORAL
Qty: 90 TABLET | Refills: 1 | Status: SHIPPED | OUTPATIENT
Start: 2024-02-14 | End: 2024-06-05 | Stop reason: SDUPTHER

## 2024-02-14 RX ORDER — METFORMIN HYDROCHLORIDE 1000 MG/1
1000 TABLET ORAL 2 TIMES DAILY
Qty: 180 TABLET | Refills: 1 | Status: SHIPPED | OUTPATIENT
Start: 2024-02-14 | End: 2024-06-05 | Stop reason: SDUPTHER

## 2024-02-14 RX ORDER — SPIRONOLACTONE 50 MG/1
50 TABLET, FILM COATED ORAL DAILY
Qty: 90 TABLET | Refills: 1 | Status: SHIPPED | OUTPATIENT
Start: 2024-02-14 | End: 2024-06-05 | Stop reason: SDUPTHER

## 2024-02-14 ASSESSMENT — ANXIETY QUESTIONNAIRES
6. BECOMING EASILY ANNOYED OR IRRITABLE: NOT AT ALL
4. TROUBLE RELAXING: NOT AT ALL
5. BEING SO RESTLESS THAT IT IS HARD TO SIT STILL: NOT AT ALL
2. NOT BEING ABLE TO STOP OR CONTROL WORRYING: NOT AT ALL
GAD7 TOTAL SCORE: 0
7. FEELING AFRAID AS IF SOMETHING AWFUL MIGHT HAPPEN: NOT AT ALL
3. WORRYING TOO MUCH ABOUT DIFFERENT THINGS: NOT AT ALL
1. FEELING NERVOUS, ANXIOUS, OR ON EDGE: NOT AT ALL

## 2024-02-14 ASSESSMENT — ENCOUNTER SYMPTOMS
OCCASIONAL FEELINGS OF UNSTEADINESS: 0
LOSS OF SENSATION IN FEET: 0
DEPRESSION: 0

## 2024-02-14 NOTE — PATIENT INSTRUCTIONS
A1C improved to 7.5, down from 8.9.  Continue farxiga 10 mg and 1000 mg metformin BID.  Increase Rybelsus to 14 mg daily.  Continue to monitor glucose.  Plan to follow up in 3-4  months.  If A1C gets below 7.0, will back off on farxiga.

## 2024-02-14 NOTE — PROGRESS NOTES
"Subjective   Patient ID: Bernard Thompson is a 58 y.o. female who presents for Follow-up.  The Rybelsus has worked well.  Occasional nausea, not bad.  Definitely has affected her  eating  habits.  Eats less.  Weight down 15 lb  since October.          Review of Systems    Objective   /79   Pulse 106   Temp 36.6 °C (97.8 °F)   Ht 1.778 m (5' 10\")   Wt 146 kg (322 lb 9.6 oz)   SpO2 96%   BMI 46.29 kg/m²     Physical Exam  Vitals reviewed.   Constitutional:       Appearance: Normal appearance.   Cardiovascular:      Rate and Rhythm: Normal rate and regular rhythm.      Heart sounds: No murmur heard.  Pulmonary:      Effort: Pulmonary effort is normal.      Breath sounds: Normal breath sounds.   Musculoskeletal:      Right lower leg: No edema.      Left lower leg: No edema.   Neurological:      Mental Status: She is alert.           Assessment/Plan   Problem List Items Addressed This Visit       Other pulmonary embolism with acute cor pulmonale (CMS/HCC)    Relevant Medications    Xarelto 20 mg tablet    Type 2 diabetes mellitus without complication, without long-term current use of insulin (CMS/HCC) - Primary    Relevant Medications    semaglutide (Rybelsus) 14 mg tablet tablet    Farxiga 10 mg    metFORMIN (Glucophage) 1,000 mg tablet    spironolactone (Aldactone) 50 mg tablet    Other Relevant Orders    POCT glycosylated hemoglobin (Hb A1C) manually resulted (Completed)          "

## 2024-06-05 ENCOUNTER — OFFICE VISIT (OUTPATIENT)
Dept: PRIMARY CARE | Facility: CLINIC | Age: 59
End: 2024-06-05
Payer: COMMERCIAL

## 2024-06-05 VITALS
DIASTOLIC BLOOD PRESSURE: 70 MMHG | WEIGHT: 293 LBS | TEMPERATURE: 97.7 F | SYSTOLIC BLOOD PRESSURE: 122 MMHG | BODY MASS INDEX: 41.95 KG/M2 | HEIGHT: 70 IN

## 2024-06-05 DIAGNOSIS — I27.82 OTHER CHRONIC PULMONARY EMBOLISM WITH ACUTE COR PULMONALE (MULTI): ICD-10-CM

## 2024-06-05 DIAGNOSIS — I26.09 OTHER CHRONIC PULMONARY EMBOLISM WITH ACUTE COR PULMONALE (MULTI): ICD-10-CM

## 2024-06-05 DIAGNOSIS — E11.9 TYPE 2 DIABETES MELLITUS WITHOUT COMPLICATION, WITHOUT LONG-TERM CURRENT USE OF INSULIN (MULTI): ICD-10-CM

## 2024-06-05 DIAGNOSIS — C54.1 ENDOMETRIAL CANCER (MULTI): ICD-10-CM

## 2024-06-05 DIAGNOSIS — E66.01 CLASS 3 SEVERE OBESITY WITH SERIOUS COMORBIDITY AND BODY MASS INDEX (BMI) OF 40.0 TO 44.9 IN ADULT, UNSPECIFIED OBESITY TYPE (MULTI): ICD-10-CM

## 2024-06-05 DIAGNOSIS — N95.2 VAGINAL ATROPHY: ICD-10-CM

## 2024-06-05 DIAGNOSIS — E11.9 TYPE 2 DIABETES MELLITUS WITHOUT COMPLICATION, WITHOUT LONG-TERM CURRENT USE OF INSULIN (MULTI): Primary | ICD-10-CM

## 2024-06-05 LAB
ANION GAP SERPL CALC-SCNC: 16 MMOL/L (ref 10–20)
BUN SERPL-MCNC: 21 MG/DL (ref 6–23)
CALCIUM SERPL-MCNC: 9.5 MG/DL (ref 8.6–10.6)
CHLORIDE SERPL-SCNC: 103 MMOL/L (ref 98–107)
CHOLEST SERPL-MCNC: 186 MG/DL (ref 0–199)
CHOLESTEROL/HDL RATIO: 3.9
CO2 SERPL-SCNC: 25 MMOL/L (ref 21–32)
CREAT SERPL-MCNC: 1.03 MG/DL (ref 0.5–1.05)
CREAT UR-MCNC: 112.5 MG/DL (ref 20–320)
EGFRCR SERPLBLD CKD-EPI 2021: 62 ML/MIN/1.73M*2
GLUCOSE SERPL-MCNC: 122 MG/DL (ref 74–99)
HDLC SERPL-MCNC: 47.7 MG/DL
LDLC SERPL CALC-MCNC: 113 MG/DL
MICROALBUMIN UR-MCNC: 9.8 MG/L
MICROALBUMIN/CREAT UR: 8.7 UG/MG CREAT
NON HDL CHOLESTEROL: 138 MG/DL (ref 0–149)
POC HEMOGLOBIN A1C: 6.7 % (ref 4.2–6.5)
POTASSIUM SERPL-SCNC: 4.6 MMOL/L (ref 3.5–5.3)
SODIUM SERPL-SCNC: 139 MMOL/L (ref 136–145)
TRIGL SERPL-MCNC: 129 MG/DL (ref 0–149)
VLDL: 26 MG/DL (ref 0–40)

## 2024-06-05 PROCEDURE — 80061 LIPID PANEL: CPT

## 2024-06-05 PROCEDURE — 83036 HEMOGLOBIN GLYCOSYLATED A1C: CPT | Performed by: FAMILY MEDICINE

## 2024-06-05 PROCEDURE — 3074F SYST BP LT 130 MM HG: CPT | Performed by: FAMILY MEDICINE

## 2024-06-05 PROCEDURE — 82043 UR ALBUMIN QUANTITATIVE: CPT

## 2024-06-05 PROCEDURE — 36415 COLL VENOUS BLD VENIPUNCTURE: CPT

## 2024-06-05 PROCEDURE — 80048 BASIC METABOLIC PNL TOTAL CA: CPT

## 2024-06-05 PROCEDURE — 3078F DIAST BP <80 MM HG: CPT | Performed by: FAMILY MEDICINE

## 2024-06-05 PROCEDURE — 3008F BODY MASS INDEX DOCD: CPT | Performed by: FAMILY MEDICINE

## 2024-06-05 PROCEDURE — 82570 ASSAY OF URINE CREATININE: CPT

## 2024-06-05 PROCEDURE — 1036F TOBACCO NON-USER: CPT | Performed by: FAMILY MEDICINE

## 2024-06-05 PROCEDURE — 99213 OFFICE O/P EST LOW 20 MIN: CPT | Performed by: FAMILY MEDICINE

## 2024-06-05 RX ORDER — SPIRONOLACTONE 50 MG/1
50 TABLET, FILM COATED ORAL DAILY
Qty: 90 TABLET | Refills: 1 | Status: SHIPPED | OUTPATIENT
Start: 2024-06-05

## 2024-06-05 RX ORDER — DAPAGLIFLOZIN 10 MG/1
10 TABLET, FILM COATED ORAL
Qty: 90 TABLET | Refills: 1 | Status: SHIPPED | OUTPATIENT
Start: 2024-06-05

## 2024-06-05 RX ORDER — METFORMIN HYDROCHLORIDE 1000 MG/1
1000 TABLET ORAL 2 TIMES DAILY
Qty: 180 TABLET | Refills: 1 | Status: SHIPPED | OUTPATIENT
Start: 2024-06-05 | End: 2024-12-02

## 2024-06-05 RX ORDER — RIVAROXABAN 20 MG/1
20 TABLET, FILM COATED ORAL DAILY
Qty: 90 TABLET | Refills: 1 | Status: SHIPPED | OUTPATIENT
Start: 2024-06-05 | End: 2024-12-02

## 2024-06-05 ASSESSMENT — ENCOUNTER SYMPTOMS
SWEATS: 0
LOSS OF SENSATION IN FEET: 0
SPEECH DIFFICULTY: 0
BLURRED VISION: 0
POLYDIPSIA: 0
BLACKOUTS: 0
POLYPHAGIA: 0
FATIGUE: 1
VISUAL CHANGE: 0
DEPRESSION: 0
WEAKNESS: 0
OCCASIONAL FEELINGS OF UNSTEADINESS: 0
TREMORS: 0
WEIGHT LOSS: 1
SEIZURES: 0
HEADACHES: 0
DIZZINESS: 0
HUNGER: 0
NERVOUS/ANXIOUS: 0
CONFUSION: 0

## 2024-06-05 ASSESSMENT — PATIENT HEALTH QUESTIONNAIRE - PHQ9
SUM OF ALL RESPONSES TO PHQ9 QUESTIONS 1 AND 2: 0
1. LITTLE INTEREST OR PLEASURE IN DOING THINGS: NOT AT ALL
2. FEELING DOWN, DEPRESSED OR HOPELESS: NOT AT ALL

## 2024-06-05 ASSESSMENT — ANXIETY QUESTIONNAIRES
6. BECOMING EASILY ANNOYED OR IRRITABLE: NOT AT ALL
GAD7 TOTAL SCORE: 0
4. TROUBLE RELAXING: NOT AT ALL
1. FEELING NERVOUS, ANXIOUS, OR ON EDGE: NOT AT ALL
3. WORRYING TOO MUCH ABOUT DIFFERENT THINGS: NOT AT ALL
7. FEELING AFRAID AS IF SOMETHING AWFUL MIGHT HAPPEN: NOT AT ALL
2. NOT BEING ABLE TO STOP OR CONTROL WORRYING: NOT AT ALL
5. BEING SO RESTLESS THAT IT IS HARD TO SIT STILL: NOT AT ALL

## 2024-06-05 NOTE — PATIENT INSTRUCTIONS
A1C improved to 6.7, down from 7.5.  Continue farxiga 10 mg,  1000 mg metformin BID, Rybelsus 14 mg daily.  Continue to monitor glucose.  Plan to follow up in 3-4  months.  Will plan to recheck  A1C then, and consider reducing Farxiga if numbers remain good.  Check fasting blood work today.    BP controlled.

## 2024-06-05 NOTE — PROGRESS NOTES
Answers submitted by the patient for this visit:  Diabetes Questionnaire (Submitted on 6/5/2024)  Chief Complaint: Diabetes problem  Diabetes type: type 2  MedicAlert ID: No  Disease duration: 5 Years  blurred vision: No  chest pain: No  fatigue: Yes  foot paresthesias: Yes  foot ulcerations: No  polydipsia: No  polyphagia: No  polyuria: No  visual change: No  weakness: No  weight loss: Yes  confusion: No  dizziness: No  headaches: No  hunger: No  mood changes: No  nervous/anxious: No  pallor: No  seizures: No  sleepiness: No  speech difficulty: No  sweats: No  tremors: No  blackouts: No  hospitalization: No  nocturnal hypoglycemia: No  required assistance: No  required glucagon: No  CVA: No  heart disease: No  impotence: No  nephropathy: No  peripheral neuropathy: No  PVD: No  retinopathy: No  Current treatments: oral agent (triple therapy)  Treatment compliance: most of the time  Home blood tests: 1-2 x per day  Home urines: <1 x per month  Monitoring compliance: good  Blood glucose trend: decreasing steadily  breakfast time: after 10 am  breakfast glucose level:   High score: 130-140  Weight trend: decreasing steadily  Exercise: rarely  Dietitian visit: No  Eye exam current: No  Sees podiatrist: No  Subjective   Patient ID: Bernard Thompson is a 58 y.o. female who presents for Follow-up (3 month ).  Rybelsus increased to 14 mg, tolerating well.  It has made a difference in how her glucose has been running.    Had ongoing cough from February to 3 week ago.  Did have cold prior.  Cough was dry.  No  wheezing.  No history of allergies.    Diabetes  She has type 2 diabetes mellitus. No MedicAlert identification noted. The initial diagnosis of diabetes was made 5 years ago. Pertinent negatives for hypoglycemia include no confusion, dizziness, headaches, hunger, mood changes, nervousness/anxiousness, pallor, seizures, sleepiness, speech difficulty, sweats or tremors. Associated symptoms include fatigue, foot  "paresthesias and weight loss. Pertinent negatives for diabetes include no blurred vision, no chest pain, no foot ulcerations, no polydipsia, no polyphagia, no polyuria, no visual change and no weakness. Pertinent negatives for hypoglycemia complications include no blackouts, no hospitalization, no nocturnal hypoglycemia, no required assistance and no required glucagon injection. Pertinent negatives for diabetic complications include no CVA, heart disease, impotence, nephropathy, peripheral neuropathy, PVD or retinopathy. Current diabetic treatment includes oral agent (triple therapy). She is compliant with treatment most of the time. Her weight is decreasing steadily. She has not had a previous visit with a dietitian. She rarely participates in exercise. She monitors blood glucose at home 1-2 x per day. She monitors urine at home <1 x per month. Blood glucose monitoring compliance is good. Her home blood glucose trend is decreasing steadily. Her breakfast blood glucose is taken after 10 am. Her breakfast blood glucose range is generally  mg/dl. She does not see a podiatrist.Eye exam is not current.       Review of Systems   Constitutional:  Positive for fatigue and weight loss.   Eyes:  Negative for blurred vision.   Cardiovascular:  Negative for chest pain.   Endocrine: Negative for polydipsia, polyphagia and polyuria.   Genitourinary:  Negative for impotence.   Skin:  Negative for pallor.   Neurological:  Negative for dizziness, tremors, seizures, speech difficulty, weakness and headaches.   Psychiatric/Behavioral:  Negative for confusion. The patient is not nervous/anxious.        Objective   /70   Temp 36.5 °C (97.7 °F)   Ht 1.778 m (5' 10\")   Wt 140 kg (308 lb 3.2 oz)   BMI 44.22 kg/m²     Physical Exam  Vitals reviewed.   Constitutional:       Appearance: Normal appearance.   Cardiovascular:      Rate and Rhythm: Normal rate and regular rhythm.      Heart sounds: No murmur heard.  Pulmonary:    "   Effort: Pulmonary effort is normal.      Breath sounds: Normal breath sounds.   Musculoskeletal:      Right lower leg: No edema.      Left lower leg: No edema.   Neurological:      Mental Status: She is alert.           Assessment/Plan   Problem List Items Addressed This Visit       Type 2 diabetes mellitus without complication, without long-term current use of insulin (Multi) - Primary    Relevant Orders    POCT glycosylated hemoglobin (Hb A1C) manually resulted    Albumin , Urine Random    Basic Metabolic Panel    Lipid Panel    Endometrial cancer (Multi)     treated          Other Visit Diagnoses       Class 3 severe obesity with serious comorbidity and body mass index (BMI) of 40.0 to 44.9 in adult, unspecified obesity type (Multi)

## 2024-10-02 ENCOUNTER — APPOINTMENT (OUTPATIENT)
Dept: PRIMARY CARE | Facility: CLINIC | Age: 59
End: 2024-10-02
Payer: COMMERCIAL

## 2024-11-05 ENCOUNTER — APPOINTMENT (OUTPATIENT)
Dept: PRIMARY CARE | Facility: CLINIC | Age: 59
End: 2024-11-05
Payer: COMMERCIAL

## 2024-11-05 VITALS
SYSTOLIC BLOOD PRESSURE: 120 MMHG | WEIGHT: 293 LBS | HEART RATE: 82 BPM | TEMPERATURE: 97.3 F | DIASTOLIC BLOOD PRESSURE: 74 MMHG | OXYGEN SATURATION: 98 % | BODY MASS INDEX: 41.95 KG/M2 | HEIGHT: 70 IN

## 2024-11-05 DIAGNOSIS — E78.00 HYPERCHOLESTEROLEMIA: ICD-10-CM

## 2024-11-05 DIAGNOSIS — I27.82 OTHER CHRONIC PULMONARY EMBOLISM WITH ACUTE COR PULMONALE: ICD-10-CM

## 2024-11-05 DIAGNOSIS — E11.9 TYPE 2 DIABETES MELLITUS WITHOUT COMPLICATION, WITHOUT LONG-TERM CURRENT USE OF INSULIN (MULTI): Primary | ICD-10-CM

## 2024-11-05 DIAGNOSIS — I26.09 OTHER CHRONIC PULMONARY EMBOLISM WITH ACUTE COR PULMONALE: ICD-10-CM

## 2024-11-05 DIAGNOSIS — E66.01 OBESITY, CLASS III, BMI 40-49.9 (MORBID OBESITY) (MULTI): ICD-10-CM

## 2024-11-05 LAB — POC HEMOGLOBIN A1C: 6.4 % (ref 4.2–6.5)

## 2024-11-05 PROCEDURE — 3049F LDL-C 100-129 MG/DL: CPT | Performed by: FAMILY MEDICINE

## 2024-11-05 PROCEDURE — 3074F SYST BP LT 130 MM HG: CPT | Performed by: FAMILY MEDICINE

## 2024-11-05 PROCEDURE — 3008F BODY MASS INDEX DOCD: CPT | Performed by: FAMILY MEDICINE

## 2024-11-05 PROCEDURE — 99213 OFFICE O/P EST LOW 20 MIN: CPT | Performed by: FAMILY MEDICINE

## 2024-11-05 PROCEDURE — 3078F DIAST BP <80 MM HG: CPT | Performed by: FAMILY MEDICINE

## 2024-11-05 PROCEDURE — 3061F NEG MICROALBUMINURIA REV: CPT | Performed by: FAMILY MEDICINE

## 2024-11-05 PROCEDURE — 83036 HEMOGLOBIN GLYCOSYLATED A1C: CPT | Performed by: FAMILY MEDICINE

## 2024-11-05 RX ORDER — SPIRONOLACTONE 50 MG/1
50 TABLET, FILM COATED ORAL DAILY
Qty: 90 TABLET | Refills: 1 | Status: SHIPPED | OUTPATIENT
Start: 2024-11-05

## 2024-11-05 RX ORDER — DAPAGLIFLOZIN 5 MG/1
5 TABLET, FILM COATED ORAL DAILY
Qty: 90 TABLET | Refills: 0 | Status: SHIPPED | OUTPATIENT
Start: 2024-11-05 | End: 2025-12-10

## 2024-11-05 RX ORDER — RIVAROXABAN 20 MG/1
20 TABLET, FILM COATED ORAL DAILY
Qty: 90 TABLET | Refills: 1 | Status: SHIPPED | OUTPATIENT
Start: 2024-11-05 | End: 2025-05-04

## 2024-11-05 RX ORDER — METFORMIN HYDROCHLORIDE 1000 MG/1
1000 TABLET ORAL 2 TIMES DAILY
Qty: 180 TABLET | Refills: 1 | Status: SHIPPED | OUTPATIENT
Start: 2024-11-05 | End: 2025-05-04

## 2024-11-05 ASSESSMENT — ENCOUNTER SYMPTOMS
DEPRESSION: 0
OCCASIONAL FEELINGS OF UNSTEADINESS: 0
LOSS OF SENSATION IN FEET: 0

## 2024-11-05 NOTE — PATIENT INSTRUCTIONS
A1C 6.4  (last time 6.7)  Taking farxiga 10 mg,  1000 mg metformin BID, Rybelsus 14 mg daily.  Will try reducing farxiga to 5 mg, when the 10 mg are gone.  Recheck A1C 3 months after the change in dose.  Continue to monitor glucose.     BP controlled.

## 2024-11-05 NOTE — PROGRESS NOTES
"Subjective   Patient ID: Bernard Thompson is a 58 y.o. female who presents for Diabetes and 4 month follow up.  Patient here for follow up of diabetes.  Last seen in June.  A1C 6.7, , HDL 47.4, GFR 62.  Currently taking farxiga 10 mg,  1000 mg metformin BID, Rybelsus 14 mg daily.  Tolerating medication, but cost is high.    Checks glucose at home.  Can see that numbers are trending down.  Weight has also been trending down.    No dizziness.  Has had about a month of tiredness.  Not checking BP.    Had rash on chest, arms, back.  Was very itchy.  Tried zyrtec.  Is not as bad as it was.  Is drying up now.  Unable to pinpoint obvious cause.                   Review of Systems    Objective   /74   Pulse 82   Temp 36.3 °C (97.3 °F) (Temporal)   Ht 1.778 m (5' 10\")   Wt 136 kg (300 lb 9.6 oz)   SpO2 98%   BMI 43.13 kg/m²     Physical Exam  Vitals reviewed.   Constitutional:       Appearance: Normal appearance.   Cardiovascular:      Rate and Rhythm: Normal rate and regular rhythm.      Heart sounds: No murmur heard.  Pulmonary:      Effort: Pulmonary effort is normal.      Breath sounds: Normal breath sounds.   Musculoskeletal:      Right lower leg: No edema.      Left lower leg: No edema.   Skin:     Comments: Papular rash, with excoriation on low back.  Few spots on arms, nearly gone.   Neurological:      Mental Status: She is alert.           Assessment/Plan   Problem List Items Addressed This Visit       Hypercholesterolemia    Obesity, Class III, BMI 40-49.9 (morbid obesity) (Multi)    Other pulmonary embolism with acute cor pulmonale    Relevant Medications    Xarelto 20 mg tablet    Type 2 diabetes mellitus without complication, without long-term current use of insulin (Multi) - Primary    Relevant Medications    spironolactone (Aldactone) 50 mg tablet    semaglutide (Rybelsus) 14 mg tablet tablet    metFORMIN (Glucophage) 1,000 mg tablet    dapagliflozin propanediol (Farxiga) 5 mg    Other " Relevant Orders    POCT glycosylated hemoglobin (Hb A1C) manually resulted (Completed)

## 2024-12-11 NOTE — PROGRESS NOTES
Patient ID: Bernard Thompson is a 58 y.o. female.  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     August 4, 2005:TOTAL ABDOMINAL HYSTERECTOMY AND BILATERAL SALPINGO-OOPHORECTOMY AND PELVIC LYMPHADENECTOMY.      September 2005:  Vaginal brachytherapy.  Treatment was given in three weekly doses of 700 cGy to 0.5 cm depth using the vaginal applicator for a total dose of 2100 cGy to the vaginal mucosa in two weeks.      Last colonoscopy was negative 2006.  10 y f/u recommended.           Objective   There were no vitals taken for this visit.      Interval history:  Patient is a 60 yo with Stage 1B, FIGO Grade 1 s/p NATY, BSO, PLND 8/2005 and s/p vaginal brachytherapy done 9/2005.  Here for annual exam. Continues to complain of vaginal pruritus and irritation. Using Estrace cream every other week. Also using Aquaphor as needed. Patient had an allergic reaction and was seen by PCP. Started taking Zyrtec and using Aveeno lotion continues to have rash and irritation of left inguinal region and back. Patient denies any vaginal bleeding, pink tinged discharge. Patient denies constipation or diarrhea. Appetite has been slightly decreased due to semaglutide. Blood sugars have been good. Energy levels are baseline. Patient denies hematuria. Patient denies urinary leakage or incontinence. Patient is not currently sexually active. Patients mammogram is scheduled for December 27th.           Physical Exam:     Constitutional: Doing well. CAROLYN  Eyes: PERRL  ENMT: Moist mucus membranes  Head/Neck: Supple. Symmetrical  Cardiovascular: Regular, rate and rhythm. 2+ equal pulses of the extremities  Respiratory/Thorax: CTA. RRR. Chest rise symmetrical.  Gastrointestinal: Non-distended, soft, non-tender  Genitourinary:   Normal external female genitalia. No vulvar lesions noted.  Atrophic tissue noted with erythematous areas.    Speculum  exam: Smooth vaginal walls without lesions or masses. Vaginal cuff visualized without lesions.   Bimanual exam: Smooth vaginal wall without lesions or masses.  Surgically absent uterus, cervix, and adnexa.    Rectovaginal exam: smooth rectovaginal septum without lesions or masses  Musculoskeletal: ROM intact, no joint swelling, normal strength  Extremities: No edema  Neurological: Alert and oriented x 3. Pleasant and cooperative.  Lymphatic: No lymphadenopathy. No lymphedema  Psychological: Appropriate mood and behavior  Skin: Warm and dry, no lesions. Maculopapular erythematous rash in the inguinal intertriginous region.      A complete review of systems was performed and all systems were normal except what is noted in the interval history.        Assessment/Plan   Patient is a 58 yo with Stage 1B, FIGO Grade 1 s/p NATY, BSO, PLND 8/2005 and s/p vaginal brachytherapy done 9/2005. CAROLYN 19 years.         PLAN:  RX for Estrace sent, intravaginally three nights a week, using dime to nickel size amount on finger and applying at specified area.  Do not use applicator.  Recommend using OTC hydrocortisone on rash.  Mammogram order given.  F/U in 1 year or as needed.   Physical examination was within normal limits today.  She is currently CAROLYN.  We reviewed signs and symptoms of possible recurrence with the patient and she will call our office should she experience any of these.      Mona SINGH  12/12/2024; 10:15     I was present with the APRN student who participated in the documentation of this note.  I have personally seen and re-examined the patient and performed the medical decision-making components (assessment and plan of care). I have reviewed the APRN student documentation and verified the findings in the note as written with additions or exceptions as stated in the body of this note.

## 2024-12-12 ENCOUNTER — APPOINTMENT (OUTPATIENT)
Dept: GYNECOLOGIC ONCOLOGY | Facility: CLINIC | Age: 59
End: 2024-12-12
Payer: COMMERCIAL

## 2024-12-12 VITALS
HEART RATE: 82 BPM | RESPIRATION RATE: 18 BRPM | DIASTOLIC BLOOD PRESSURE: 78 MMHG | WEIGHT: 293 LBS | SYSTOLIC BLOOD PRESSURE: 129 MMHG | HEIGHT: 70 IN | BODY MASS INDEX: 41.95 KG/M2

## 2024-12-12 DIAGNOSIS — C54.1 ENDOMETRIAL CANCER (MULTI): ICD-10-CM

## 2024-12-12 DIAGNOSIS — N95.2 VAGINAL ATROPHY: ICD-10-CM

## 2024-12-12 DIAGNOSIS — Z12.31 SCREENING MAMMOGRAM, ENCOUNTER FOR: Primary | ICD-10-CM

## 2024-12-12 PROCEDURE — 3074F SYST BP LT 130 MM HG: CPT | Performed by: NURSE PRACTITIONER

## 2024-12-12 PROCEDURE — 1036F TOBACCO NON-USER: CPT | Performed by: NURSE PRACTITIONER

## 2024-12-12 PROCEDURE — 3061F NEG MICROALBUMINURIA REV: CPT | Performed by: NURSE PRACTITIONER

## 2024-12-12 PROCEDURE — 3049F LDL-C 100-129 MG/DL: CPT | Performed by: NURSE PRACTITIONER

## 2024-12-12 PROCEDURE — 3078F DIAST BP <80 MM HG: CPT | Performed by: NURSE PRACTITIONER

## 2024-12-12 PROCEDURE — 99213 OFFICE O/P EST LOW 20 MIN: CPT | Performed by: NURSE PRACTITIONER

## 2024-12-12 PROCEDURE — 3008F BODY MASS INDEX DOCD: CPT | Performed by: NURSE PRACTITIONER

## 2024-12-12 RX ORDER — ESTRADIOL 0.1 MG/G
CREAM VAGINAL
Qty: 42.5 G | Refills: 3 | Status: SHIPPED | OUTPATIENT
Start: 2024-12-12

## 2024-12-12 ASSESSMENT — PAIN SCALES - GENERAL: PAINLEVEL_OUTOF10: 0-NO PAIN

## 2024-12-27 ENCOUNTER — HOSPITAL ENCOUNTER (OUTPATIENT)
Dept: RADIOLOGY | Facility: HOSPITAL | Age: 59
Discharge: HOME | End: 2024-12-27
Payer: COMMERCIAL

## 2024-12-27 DIAGNOSIS — Z12.31 SCREENING MAMMOGRAM, ENCOUNTER FOR: ICD-10-CM

## 2024-12-27 PROCEDURE — 77067 SCR MAMMO BI INCL CAD: CPT

## 2025-04-16 ENCOUNTER — OFFICE VISIT (OUTPATIENT)
Dept: URGENT CARE | Age: 60
End: 2025-04-16
Payer: COMMERCIAL

## 2025-04-16 VITALS
TEMPERATURE: 98 F | RESPIRATION RATE: 18 BRPM | DIASTOLIC BLOOD PRESSURE: 71 MMHG | OXYGEN SATURATION: 98 % | SYSTOLIC BLOOD PRESSURE: 138 MMHG | HEART RATE: 84 BPM

## 2025-04-16 DIAGNOSIS — R68.89 FLU-LIKE SYMPTOMS: ICD-10-CM

## 2025-04-16 DIAGNOSIS — R05.1 ACUTE COUGH: ICD-10-CM

## 2025-04-16 DIAGNOSIS — H66.93 BILATERAL ACUTE OTITIS MEDIA: Primary | ICD-10-CM

## 2025-04-16 DIAGNOSIS — N76.0 ACUTE VAGINITIS: ICD-10-CM

## 2025-04-16 LAB
POC CORONAVIRUS SARS-COV-2 PCR: NEGATIVE
POC HUMAN RHINOVIRUS PCR: NEGATIVE
POC INFLUENZA A VIRUS PCR: NEGATIVE
POC INFLUENZA B VIRUS PCR: NEGATIVE
POC RESPIRATORY SYNCYTIAL VIRUS PCR: NEGATIVE

## 2025-04-16 RX ORDER — BENZONATATE 200 MG/1
200 CAPSULE ORAL 3 TIMES DAILY PRN
Qty: 30 CAPSULE | Refills: 0 | Status: SHIPPED | OUTPATIENT
Start: 2025-04-16 | End: 2025-04-26

## 2025-04-16 RX ORDER — GUAIFENESIN 1200 MG/1
1200 TABLET, EXTENDED RELEASE ORAL 2 TIMES DAILY
Qty: 14 TABLET | Refills: 0 | Status: SHIPPED | OUTPATIENT
Start: 2025-04-16 | End: 2025-04-23

## 2025-04-16 RX ORDER — AMOXICILLIN AND CLAVULANATE POTASSIUM 875; 125 MG/1; MG/1
875 TABLET, FILM COATED ORAL 2 TIMES DAILY
Qty: 14 TABLET | Refills: 0 | Status: SHIPPED | OUTPATIENT
Start: 2025-04-16 | End: 2025-04-23

## 2025-04-16 RX ORDER — FLUCONAZOLE 150 MG/1
150 TABLET ORAL ONCE
Qty: 1 TABLET | Refills: 0 | Status: SHIPPED | OUTPATIENT
Start: 2025-04-16 | End: 2025-04-16

## 2025-04-16 ASSESSMENT — ENCOUNTER SYMPTOMS
ARTHRALGIAS: 0
PALPITATIONS: 0
NAUSEA: 0
FATIGUE: 0
APPETITE CHANGE: 0
CHILLS: 0
EYE PAIN: 0
BACK PAIN: 0
SINUS PRESSURE: 0
SORE THROAT: 0
SHORTNESS OF BREATH: 0
SINUS PAIN: 0
ABDOMINAL PAIN: 0
VOMITING: 0
MYALGIAS: 0
TROUBLE SWALLOWING: 0
RHINORRHEA: 0
WOUND: 0
CONSTIPATION: 0
DIFFICULTY URINATING: 0
HEADACHES: 0
CHEST TIGHTNESS: 0
DIZZINESS: 0
FEVER: 0
DIARRHEA: 0
COUGH: 1
NECK PAIN: 0

## 2025-04-16 ASSESSMENT — VISUAL ACUITY: OU: 1

## 2025-04-16 NOTE — PROGRESS NOTES
Subjective   Patient ID: Bernard Thompson is a 59 y.o. female. They present today with a chief complaint of Cough (4 days ).    History of Present Illness  Patient is a 60yo female who presents with a cough x 4 days.       Cough  Pertinent negatives include no chest pain, chills, ear pain, fever, headaches, myalgias, postnasal drip, rash, rhinorrhea, sore throat or shortness of breath.       Past Medical History  Allergies as of 04/16/2025 - Reviewed 04/16/2025   Allergen Reaction Noted    Fluticasone Shortness of breath 08/11/2016    Tree nuts Anaphylaxis 08/11/2016    Apixaban Hives 08/11/2016       Prescriptions Prior to Admission[1]     Medical History[2]    Surgical History[3]     reports that she has never smoked. She has never been exposed to tobacco smoke. She has never used smokeless tobacco. She reports current alcohol use of about 1.0 standard drink of alcohol per week. She reports that she does not use drugs.    Review of Systems  Review of Systems   Constitutional:  Negative for appetite change, chills, fatigue and fever.   HENT:  Positive for congestion. Negative for dental problem, ear pain, hearing loss, postnasal drip, rhinorrhea, sinus pressure, sinus pain, sneezing, sore throat and trouble swallowing.    Eyes:  Negative for pain.   Respiratory:  Positive for cough. Negative for chest tightness and shortness of breath.    Cardiovascular:  Negative for chest pain and palpitations.   Gastrointestinal:  Negative for abdominal pain, constipation, diarrhea, nausea and vomiting.   Genitourinary:  Negative for difficulty urinating.   Musculoskeletal:  Negative for arthralgias, back pain, gait problem, myalgias and neck pain.   Skin:  Negative for rash and wound.   Neurological:  Negative for dizziness and headaches.   Psychiatric/Behavioral:  Negative for self-injury and suicidal ideas.                                   Objective    Vitals:    04/16/25 1533   BP: 138/71   Pulse: 84   Resp: 18   Temp: 36.7  °C (98 °F)   SpO2: 98%     No LMP recorded. Patient has had a hysterectomy.    Physical Exam  Vitals reviewed.   Constitutional:       General: She is awake. She is not in acute distress.     Appearance: Normal appearance. She is well-groomed and overweight. She is not ill-appearing.   HENT:      Head: Normocephalic and atraumatic.      Right Ear: Hearing, ear canal and external ear normal. Swelling and tenderness present. A middle ear effusion is present. Tympanic membrane is erythematous and bulging.      Left Ear: Hearing, ear canal and external ear normal. Swelling and tenderness present. A middle ear effusion is present. Tympanic membrane is erythematous and bulging.      Nose: Congestion present. No nasal deformity, signs of injury or rhinorrhea.      Mouth/Throat:      Lips: Pink.      Mouth: Mucous membranes are moist. No injury.      Dentition: Normal dentition.      Pharynx: Oropharynx is clear. Uvula midline. No pharyngeal swelling, oropharyngeal exudate, posterior oropharyngeal erythema, uvula swelling or postnasal drip.      Tonsils: No tonsillar exudate.   Eyes:      General: Lids are normal. Vision grossly intact. Gaze aligned appropriately.         Right eye: Right eye discharge: r05.1.   Cardiovascular:      Rate and Rhythm: Normal rate and regular rhythm.      Heart sounds: Normal heart sounds, S1 normal and S2 normal. Heart sounds not distant. No murmur heard.     No friction rub. No gallop.   Pulmonary:      Effort: Pulmonary effort is normal. No tachypnea, bradypnea, accessory muscle usage, prolonged expiration, respiratory distress or retractions.      Breath sounds: Normal breath sounds and air entry. No stridor, decreased air movement or transmitted upper airway sounds. No decreased breath sounds, wheezing, rhonchi or rales.   Lymphadenopathy:      Head:      Right side of head: No submental, submandibular, tonsillar, preauricular, posterior auricular or occipital adenopathy.      Left side  of head: No submental, submandibular, tonsillar, preauricular, posterior auricular or occipital adenopathy.      Cervical:      Right cervical: No superficial cervical adenopathy.     Left cervical: No superficial cervical adenopathy.   Skin:     General: Skin is warm and dry.      Capillary Refill: Capillary refill takes less than 2 seconds.   Neurological:      General: No focal deficit present.      Mental Status: She is alert.      Gait: Gait is intact.   Psychiatric:         Attention and Perception: Attention and perception normal.         Mood and Affect: Mood and affect normal.         Speech: Speech normal.         Behavior: Behavior normal. Behavior is cooperative.         Procedures    Point of Care Test & Imaging Results from this visit  Results for orders placed or performed in visit on 04/16/25   POCT SPOTFIRE R/ST Panel Mini [facility]/COVID [facility]) manually resulted    Specimen: Swab   Result Value Ref Range    POC Sars-Cov-2 PCR Negative Negative    POC Respiratory Syncytial Virus PCR Negative Negative    POC Influenza A Virus PCR Negative Negative    POC Influenza B Virus PCR Negative Negative    POC Human Rhinovirus PCR Negative Negative      Imaging  No results found.    Cardiology, Vascular, and Other Imaging  No other imaging results found for the past 2 days      Diagnostic study results (if any) were reviewed by DORIE Moore.    Assessment/Plan   Allergies, medications, history, and pertinent labs/EKGs/Imaging reviewed by DORIE Moore.     Medical Decision Making  Physical exam consistent with bilateral OM. Also advise symptomatic care including warm salt water gargles, humidified air, rest, fluids, evening antihistamine in addtion to the medication regimen detailed below.     Risks, benefits, and alternatives of the medications and treatment plan prescribed today were discussed, and patient expressed understanding. Plan follow up as discussed or as needed if  any worsening symptoms or change in condition. Reinforced red flags including (but not limited to): severe or worsening abdominal pain; difficulty swallowing; stiff neck; shortness of breath; coughing or vomiting blood; chest pain; and new or increased fever are indications to go to the Emergency Department.    The patient voices understanding of all medications. No barriers to adherence. Patient is taking all medications as prescribed and tolerating well. For any new medications, the patient was instructed of directions for and consequences of not taking medication and they were informed about the potential side effects and drug interactions. The after-visit summary was given to the patient and care instructions were reviewed with the patient. All questions were answered and the patient verbalized understanding of the plan of care for today.    Orders and Diagnoses  Diagnoses and all orders for this visit:  Bilateral acute otitis media  -     amoxicillin-clavulanate (Augmentin) 875-125 mg tablet; Take 1 tablet (875 mg) by mouth 2 times a day for 7 days.  Flu-like symptoms  -     POCT SPOTFIRE R/ST Panel Mini w/COVID ([facility]) manually resulted  Acute cough  -     benzonatate (Tessalon) 200 mg capsule; Take 1 capsule (200 mg) by mouth 3 times a day as needed for cough for up to 10 days. Do not crush or chew.  -     guaiFENesin (Mucinex) 1,200 mg tablet extended release 12hr; Take 1 tablet (1,200 mg) by mouth 2 times a day for 7 days.      Medical Admin Record      Patient disposition: Home    Electronically signed by DORIE Moore  4:00 PM           [1] (Not in a hospital admission)   [2]   Past Medical History:  Diagnosis Date    Abnormal coagulation profile 07/20/2016    Supratherapeutic INR    Abnormal finding of blood chemistry, unspecified 07/06/2016    Abnormal blood chemistry    Acute pharyngitis, unspecified 07/20/2016    Sore throat    Allergy, unspecified, initial encounter 07/20/2016     Allergic reaction    Chronic rhinitis 10/22/2014    Rhinitis    Personal history of other venous thrombosis and embolism 03/23/2021    History of deep venous thrombosis    Personal history of pulmonary embolism 03/23/2021    History of pulmonary embolism    Unspecified skin changes 07/06/2016    Skin change    Urticaria, unspecified 07/20/2016    Hives   [3]   Past Surgical History:  Procedure Laterality Date    HYSTERECTOMY      HYSTEROSCOPY  11/25/2013    Hysteroscopy With Endometrial Ablation    KNEE ARTHROPLASTY  11/25/2013    Knee Arthroplasty    OTHER SURGICAL HISTORY  11/25/2013    Adrenalectomy Unilateral Left Sided    OTHER SURGICAL HISTORY  03/23/2021    Total hysterectomy with removal of both tubes and ovaries

## 2025-04-18 DIAGNOSIS — E11.9 TYPE 2 DIABETES MELLITUS WITHOUT COMPLICATION, WITHOUT LONG-TERM CURRENT USE OF INSULIN: ICD-10-CM

## 2025-04-19 RX ORDER — DAPAGLIFLOZIN 5 MG/1
5 TABLET, FILM COATED ORAL DAILY
Qty: 90 TABLET | Refills: 0 | Status: SHIPPED | OUTPATIENT
Start: 2025-04-19 | End: 2025-07-18

## 2025-05-12 ENCOUNTER — APPOINTMENT (OUTPATIENT)
Dept: PRIMARY CARE | Facility: CLINIC | Age: 60
End: 2025-05-12
Payer: COMMERCIAL

## 2025-05-12 VITALS
SYSTOLIC BLOOD PRESSURE: 110 MMHG | HEIGHT: 70 IN | OXYGEN SATURATION: 95 % | BODY MASS INDEX: 41.95 KG/M2 | HEART RATE: 93 BPM | DIASTOLIC BLOOD PRESSURE: 68 MMHG | WEIGHT: 293 LBS

## 2025-05-12 DIAGNOSIS — C54.1 ENDOMETRIAL CANCER (MULTI): ICD-10-CM

## 2025-05-12 DIAGNOSIS — E11.9 TYPE 2 DIABETES MELLITUS WITHOUT COMPLICATION, WITHOUT LONG-TERM CURRENT USE OF INSULIN: Primary | ICD-10-CM

## 2025-05-12 DIAGNOSIS — I27.82 OTHER CHRONIC PULMONARY EMBOLISM WITH ACUTE COR PULMONALE: ICD-10-CM

## 2025-05-12 DIAGNOSIS — R21 RASH: ICD-10-CM

## 2025-05-12 DIAGNOSIS — L98.9 SKIN LESIONS: ICD-10-CM

## 2025-05-12 DIAGNOSIS — I26.09 OTHER CHRONIC PULMONARY EMBOLISM WITH ACUTE COR PULMONALE: ICD-10-CM

## 2025-05-12 PROCEDURE — 99214 OFFICE O/P EST MOD 30 MIN: CPT | Performed by: FAMILY MEDICINE

## 2025-05-12 PROCEDURE — 3078F DIAST BP <80 MM HG: CPT | Performed by: FAMILY MEDICINE

## 2025-05-12 PROCEDURE — 3008F BODY MASS INDEX DOCD: CPT | Performed by: FAMILY MEDICINE

## 2025-05-12 PROCEDURE — 3074F SYST BP LT 130 MM HG: CPT | Performed by: FAMILY MEDICINE

## 2025-05-12 NOTE — PROGRESS NOTES
"Subjective   Patient ID: Bernard Thompson is a 59 y.o. female who presents for Diabetes.  Here for follow up regarding DM, with other concerns as well.    Went to  a month ago.  Had 2 ear infections.  Neg flu, RSV, covid.  Was given antibiotic, and is doing better, but still some leftover symptoms.  Left ear still blocked.  Still coughs if talks or walks, has postnasal drip.  Mucous is clear.  In AM can feel mucous in chest.    A1C 6.4 in November.  Other blood work in June with  , HDL 47.4, GFR 62.  Currently taking farxiga 5 mg,  1000 mg metformin BID, Rybelsus 14 mg daily.  Tolerating medication.    Not checking BP at home.  Glucose in AM is 130's.  At home around 6 PM .    Getting rashes still, is itchy.  Left antecubital area.  Also some skin lesions that she would like checked.      Diabetes  She presents for her follow-up diabetic visit. She has type 2 diabetes mellitus. There are no hypoglycemic associated symptoms. Pertinent negatives for diabetes include no blurred vision, no chest pain, no fatigue, no foot paresthesias and no foot ulcerations. Pertinent negatives for diabetic complications include no CVA, heart disease or retinopathy. Risk factors for coronary artery disease include diabetes mellitus, obesity and post-menopausal. An ACE inhibitor/angiotensin II receptor blocker is not being taken.       Review of Systems   Constitutional:  Negative for fatigue.   Eyes:  Negative for blurred vision.   Cardiovascular:  Negative for chest pain.       Objective   /68   Pulse 93   Ht 1.778 m (5' 10\")   Wt 140 kg (308 lb 12.8 oz)   SpO2 95%   BMI 44.31 kg/m²     Physical Exam  Vitals reviewed.   Constitutional:       Appearance: Normal appearance.   HENT:      Right Ear: Ear canal normal.      Left Ear: Ear canal normal.      Ears:      Comments: Both TM with clear fluid behind them.  Left ear TM is red and dull.  Cardiovascular:      Rate and Rhythm: Normal rate and regular rhythm.     " " Heart sounds: No murmur heard.  Pulmonary:      Effort: Pulmonary effort is normal.      Breath sounds: Normal breath sounds.   Musculoskeletal:      Right lower leg: No edema.      Left lower leg: No edema.   Neurological:      Mental Status: She is alert.   Psychiatric:         Mood and Affect: Mood normal.         Behavior: Behavior normal.           Assessment/Plan   Problem List Items Addressed This Visit       Other pulmonary embolism with acute cor pulmonale    Taking Xarelto         Type 2 diabetes mellitus without complication, without long-term current use of insulin - Primary    Relevant Orders    Hemoglobin A1C    Comprehensive Metabolic Panel    Albumin-Creatinine Ratio, Urine Random    Lipid Panel    Endometrial cancer (Multi)    Following with Dr. Valero.  \"Assessment/Plan   Patient is a 59 yo with Stage 1B, FIGO Grade 1 s/p NATY, BSO, PLND 8/2005 and s/p vaginal brachytherapy done 9/2005.  CAROLYN 7 yr.  Vaginal atrophy.\"          Other Visit Diagnoses         Skin lesions        Relevant Orders    Referral to Dermatology      Rash        Relevant Orders    Referral to Dermatology               "

## 2025-05-13 ASSESSMENT — ENCOUNTER SYMPTOMS
FATIGUE: 0
BLURRED VISION: 0

## 2025-05-13 NOTE — PATIENT INSTRUCTIONS
A1C 6.4  in November.  Taking farxiga 5 mg,  1000 mg metformin BID, Rybelsus 14 mg daily.  Check fasting blood work with A1C.                                          Treatment goals include:  HgbA1C less than 7.0  /80 on consistent basis, with no higher than 140/85  LDL cholesterol less than 100, and HDL cholesterol above 40.  Yearly retinal exam  Check feet periodically for sores or decreased sensation  Exercise at least 150 minutes weekly.  Work toward a goal BMI of less than 25.    BP controlled.  No medications being taken.    Refer to dermatology for ongoing rash and skin lesions.    Recent bilateral ear infections, improving.  Still residual fluid behind eardrums.  Use Flonase nasal spray daily to help with congestion.  Reach out if not continuing to improve.

## 2025-05-13 NOTE — ASSESSMENT & PLAN NOTE
"Following with Dr. Valero.  \"Assessment/Plan   Patient is a 59 yo with Stage 1B, FIGO Grade 1 s/p NATY, BSO, PLND 8/2005 and s/p vaginal brachytherapy done 9/2005.  CAROLYN 7 yr.  Vaginal atrophy.\"  "

## 2025-05-18 LAB
ALBUMIN SERPL-MCNC: 3.8 G/DL (ref 3.6–5.1)
ALBUMIN/CREAT UR: NORMAL
ALP SERPL-CCNC: 81 U/L (ref 37–153)
ALT SERPL-CCNC: 13 U/L (ref 6–29)
ANION GAP SERPL CALCULATED.4IONS-SCNC: 9 MMOL/L (CALC) (ref 7–17)
AST SERPL-CCNC: 12 U/L (ref 10–35)
BILIRUB SERPL-MCNC: 0.6 MG/DL (ref 0.2–1.2)
BUN SERPL-MCNC: 13 MG/DL (ref 7–25)
CALCIUM SERPL-MCNC: 8.9 MG/DL (ref 8.6–10.4)
CHLORIDE SERPL-SCNC: 102 MMOL/L (ref 98–110)
CHOLEST SERPL-MCNC: 207 MG/DL
CHOLEST/HDLC SERPL: 4.2 (CALC)
CO2 SERPL-SCNC: 25 MMOL/L (ref 20–32)
CREAT SERPL-MCNC: 1 MG/DL (ref 0.5–1.05)
CREAT UR-MCNC: NORMAL MG/DL
EGFRCR SERPLBLD CKD-EPI 2021: 64 ML/MIN/1.73M2
EST. AVERAGE GLUCOSE BLD GHB EST-MCNC: 148 MG/DL
EST. AVERAGE GLUCOSE BLD GHB EST-SCNC: 8.2 MMOL/L
GLUCOSE SERPL-MCNC: 123 MG/DL (ref 65–99)
HBA1C MFR BLD: 6.8 %
HDLC SERPL-MCNC: 49 MG/DL
LDLC SERPL CALC-MCNC: 133 MG/DL (CALC)
MICROALBUMIN UR-MCNC: NORMAL
NONHDLC SERPL-MCNC: 158 MG/DL (CALC)
POTASSIUM SERPL-SCNC: 4.3 MMOL/L (ref 3.5–5.3)
PROT SERPL-MCNC: 7.2 G/DL (ref 6.1–8.1)
SODIUM SERPL-SCNC: 136 MMOL/L (ref 135–146)
TRIGL SERPL-MCNC: 137 MG/DL

## 2025-05-19 LAB
ALBUMIN SERPL-MCNC: 3.8 G/DL (ref 3.6–5.1)
ALBUMIN/CREAT UR: 6 MG/G CREAT
ALP SERPL-CCNC: 81 U/L (ref 37–153)
ALT SERPL-CCNC: 13 U/L (ref 6–29)
ANION GAP SERPL CALCULATED.4IONS-SCNC: 9 MMOL/L (CALC) (ref 7–17)
AST SERPL-CCNC: 12 U/L (ref 10–35)
BILIRUB SERPL-MCNC: 0.6 MG/DL (ref 0.2–1.2)
BUN SERPL-MCNC: 13 MG/DL (ref 7–25)
CALCIUM SERPL-MCNC: 8.9 MG/DL (ref 8.6–10.4)
CHLORIDE SERPL-SCNC: 102 MMOL/L (ref 98–110)
CHOLEST SERPL-MCNC: 207 MG/DL
CHOLEST/HDLC SERPL: 4.2 (CALC)
CO2 SERPL-SCNC: 25 MMOL/L (ref 20–32)
CREAT SERPL-MCNC: 1 MG/DL (ref 0.5–1.05)
CREAT UR-MCNC: 103 MG/DL (ref 20–275)
EGFRCR SERPLBLD CKD-EPI 2021: 64 ML/MIN/1.73M2
EST. AVERAGE GLUCOSE BLD GHB EST-MCNC: 148 MG/DL
EST. AVERAGE GLUCOSE BLD GHB EST-SCNC: 8.2 MMOL/L
GLUCOSE SERPL-MCNC: 123 MG/DL (ref 65–99)
HBA1C MFR BLD: 6.8 %
HDLC SERPL-MCNC: 49 MG/DL
LDLC SERPL CALC-MCNC: 133 MG/DL (CALC)
MICROALBUMIN UR-MCNC: 0.6 MG/DL
NONHDLC SERPL-MCNC: 158 MG/DL (CALC)
POTASSIUM SERPL-SCNC: 4.3 MMOL/L (ref 3.5–5.3)
PROT SERPL-MCNC: 7.2 G/DL (ref 6.1–8.1)
SODIUM SERPL-SCNC: 136 MMOL/L (ref 135–146)
TRIGL SERPL-MCNC: 137 MG/DL

## 2025-09-15 ENCOUNTER — APPOINTMENT (OUTPATIENT)
Dept: PRIMARY CARE | Facility: CLINIC | Age: 60
End: 2025-09-15
Payer: COMMERCIAL

## 2025-12-11 ENCOUNTER — APPOINTMENT (OUTPATIENT)
Dept: GYNECOLOGIC ONCOLOGY | Facility: CLINIC | Age: 60
End: 2025-12-11
Payer: COMMERCIAL

## 2025-12-18 ENCOUNTER — APPOINTMENT (OUTPATIENT)
Dept: GYNECOLOGIC ONCOLOGY | Facility: CLINIC | Age: 60
End: 2025-12-18
Payer: COMMERCIAL